# Patient Record
Sex: MALE | Race: BLACK OR AFRICAN AMERICAN | URBAN - METROPOLITAN AREA
[De-identification: names, ages, dates, MRNs, and addresses within clinical notes are randomized per-mention and may not be internally consistent; named-entity substitution may affect disease eponyms.]

---

## 2021-01-07 ENCOUNTER — OFFICE VISIT (OUTPATIENT)
Dept: URBAN - METROPOLITAN AREA CLINIC 82 | Facility: CLINIC | Age: 51
End: 2021-01-07
Payer: COMMERCIAL

## 2021-01-07 VITALS
BODY MASS INDEX: 28.14 KG/M2 | HEART RATE: 76 BPM | DIASTOLIC BLOOD PRESSURE: 92 MMHG | TEMPERATURE: 98.3 F | WEIGHT: 190 LBS | RESPIRATION RATE: 16 BRPM | SYSTOLIC BLOOD PRESSURE: 144 MMHG | HEIGHT: 69 IN

## 2021-01-07 DIAGNOSIS — K59.01 CONSTIPATION: ICD-10-CM

## 2021-01-07 DIAGNOSIS — Z43.1 PEG (PERCUTANEOUS ENDOSCOPIC GASTROSTOMY) ADJUSTMENT/REPLACEMENT/REMOVAL: ICD-10-CM

## 2021-01-07 PROBLEM — 14760008 CONSTIPATION: Status: ACTIVE | Noted: 2021-01-07

## 2021-01-07 PROCEDURE — 99212 OFFICE O/P EST SF 10 MIN: CPT | Performed by: INTERNAL MEDICINE

## 2021-01-07 RX ORDER — ASPIRIN 81 MG/1
TAKE 1 TABLET (81 MG) BY ORAL ROUTE ONCE DAILY TABLET, COATED ORAL 1
Qty: 0 | Refills: 0 | Status: ACTIVE | COMMUNITY
Start: 1900-01-01

## 2021-01-07 RX ORDER — CARVEDILOL 25 MG/1
TAKE 1 TABLET (25 MG) BY ORAL ROUTE 2 TIMES PER DAY WITH FOOD TABLET, FILM COATED ORAL 2
Qty: 0 | Refills: 0 | Status: ACTIVE | COMMUNITY
Start: 1900-01-01

## 2021-01-07 RX ORDER — LOSARTAN POTASSIUM 50 MG/1
TAKE 1 TABLET (50 MG) BY ORAL ROUTE ONCE DAILY TABLET ORAL 1
Qty: 0 | Refills: 0 | Status: ACTIVE | COMMUNITY
Start: 1900-01-01

## 2021-01-07 RX ORDER — PRAVASTATIN SODIUM 40 MG/1
TAKE 1 TABLET (40 MG) BY ORAL ROUTE ONCE DAILY TABLET ORAL 1
Qty: 0 | Refills: 0 | Status: ACTIVE | COMMUNITY
Start: 1900-01-01

## 2021-01-07 RX ORDER — AMANTADINE HYDROCHLORIDE 100 MG/1
TAKE 1 CAPSULE (100 MG) BY ORAL ROUTE ONCE DAILY CAPSULE ORAL 1
Qty: 0 | Refills: 0 | Status: ACTIVE | COMMUNITY
Start: 1900-01-01

## 2021-01-07 NOTE — PHYSICAL EXAM GASTROINTESTINAL
Abdomen , soft, nontender, nondistended , no guarding or rigidity , no masses palpable , PEG in place

## 2021-02-18 ENCOUNTER — OFFICE VISIT (OUTPATIENT)
Dept: URBAN - METROPOLITAN AREA CLINIC 82 | Facility: CLINIC | Age: 51
End: 2021-02-18
Payer: COMMERCIAL

## 2021-02-18 VITALS
HEIGHT: 69 IN | RESPIRATION RATE: 16 BRPM | HEART RATE: 80 BPM | DIASTOLIC BLOOD PRESSURE: 84 MMHG | BODY MASS INDEX: 28.14 KG/M2 | WEIGHT: 190 LBS | TEMPERATURE: 97.4 F | SYSTOLIC BLOOD PRESSURE: 132 MMHG

## 2021-02-18 DIAGNOSIS — Z43.1 PEG (PERCUTANEOUS ENDOSCOPIC GASTROSTOMY) ADJUSTMENT/REPLACEMENT/REMOVAL: ICD-10-CM

## 2021-02-18 PROCEDURE — 99212 OFFICE O/P EST SF 10 MIN: CPT | Performed by: INTERNAL MEDICINE

## 2021-02-18 RX ORDER — ASPIRIN 81 MG/1
TAKE 1 TABLET (81 MG) BY ORAL ROUTE ONCE DAILY TABLET, COATED ORAL 1
Qty: 0 | Refills: 0 | Status: ACTIVE | COMMUNITY
Start: 1900-01-01

## 2021-02-18 RX ORDER — CARVEDILOL 25 MG/1
TAKE 1 TABLET (25 MG) BY ORAL ROUTE 2 TIMES PER DAY WITH FOOD TABLET, FILM COATED ORAL 2
Qty: 0 | Refills: 0 | Status: ACTIVE | COMMUNITY
Start: 1900-01-01

## 2021-02-18 RX ORDER — PRAVASTATIN SODIUM 40 MG/1
TAKE 1 TABLET (40 MG) BY ORAL ROUTE ONCE DAILY TABLET ORAL 1
Qty: 0 | Refills: 0 | Status: ACTIVE | COMMUNITY
Start: 1900-01-01

## 2021-02-18 RX ORDER — AMANTADINE HYDROCHLORIDE 100 MG/1
TAKE 1 CAPSULE (100 MG) BY ORAL ROUTE ONCE DAILY CAPSULE ORAL 1
Qty: 0 | Refills: 0 | Status: ACTIVE | COMMUNITY
Start: 1900-01-01

## 2021-02-18 RX ORDER — LOSARTAN POTASSIUM 50 MG/1
TAKE 1 TABLET (50 MG) BY ORAL ROUTE ONCE DAILY TABLET ORAL 1
Qty: 0 | Refills: 0 | Status: ACTIVE | COMMUNITY
Start: 1900-01-01

## 2022-01-11 ENCOUNTER — OFFICE VISIT (OUTPATIENT)
Dept: URBAN - METROPOLITAN AREA CLINIC 82 | Facility: CLINIC | Age: 52
End: 2022-01-11

## 2022-01-12 ENCOUNTER — OFFICE VISIT (OUTPATIENT)
Dept: URBAN - METROPOLITAN AREA CLINIC 82 | Facility: CLINIC | Age: 52
End: 2022-01-12

## 2022-02-10 ENCOUNTER — OFFICE VISIT (OUTPATIENT)
Dept: URBAN - METROPOLITAN AREA CLINIC 82 | Facility: CLINIC | Age: 52
End: 2022-02-10
Payer: COMMERCIAL

## 2022-02-10 VITALS
HEIGHT: 69 IN | TEMPERATURE: 97.2 F | BODY MASS INDEX: 28.14 KG/M2 | HEART RATE: 72 BPM | DIASTOLIC BLOOD PRESSURE: 107 MMHG | WEIGHT: 190 LBS | SYSTOLIC BLOOD PRESSURE: 148 MMHG

## 2022-02-10 DIAGNOSIS — Z43.1 PEG (PERCUTANEOUS ENDOSCOPIC GASTROSTOMY) ADJUSTMENT/REPLACEMENT/REMOVAL: ICD-10-CM

## 2022-02-10 DIAGNOSIS — K94.23 PEG TUBE MALFUNCTION: ICD-10-CM

## 2022-02-10 PROCEDURE — 99213 OFFICE O/P EST LOW 20 MIN: CPT | Performed by: INTERNAL MEDICINE

## 2022-02-10 PROCEDURE — 49452 REPLACE G-J TUBE PERC: CPT | Performed by: INTERNAL MEDICINE

## 2022-02-10 RX ORDER — AMANTADINE HYDROCHLORIDE 100 MG/1
TAKE 1 CAPSULE (100 MG) BY ORAL ROUTE ONCE DAILY CAPSULE ORAL 1
Qty: 0 | Refills: 0 | Status: ACTIVE | COMMUNITY
Start: 1900-01-01

## 2022-02-10 RX ORDER — PRAVASTATIN SODIUM 40 MG/1
TAKE 1 TABLET (40 MG) BY ORAL ROUTE ONCE DAILY TABLET ORAL 1
Qty: 0 | Refills: 0 | Status: ACTIVE | COMMUNITY
Start: 1900-01-01

## 2022-02-10 RX ORDER — LOSARTAN POTASSIUM 50 MG/1
TAKE 1 TABLET (50 MG) BY ORAL ROUTE ONCE DAILY TABLET ORAL 1
Qty: 0 | Refills: 0 | Status: ACTIVE | COMMUNITY
Start: 1900-01-01

## 2022-02-10 RX ORDER — ASPIRIN 81 MG/1
TAKE 1 TABLET (81 MG) BY ORAL ROUTE ONCE DAILY TABLET, COATED ORAL 1
Qty: 0 | Refills: 0 | Status: ON HOLD | COMMUNITY
Start: 1900-01-01

## 2022-02-10 RX ORDER — CARVEDILOL 25 MG/1
TAKE 1 TABLET (25 MG) BY ORAL ROUTE 2 TIMES PER DAY WITH FOOD TABLET, FILM COATED ORAL 2
Qty: 0 | Refills: 0 | Status: ACTIVE | COMMUNITY
Start: 1900-01-01

## 2022-02-10 NOTE — HPI-OTHER HISTORIES
requesting peg replacement 20Fr, tube working but feeding flow has changes, has noticed some oozing blood around skin site, no pain

## 2023-02-16 ENCOUNTER — OFFICE VISIT (OUTPATIENT)
Dept: URBAN - METROPOLITAN AREA CLINIC 82 | Facility: CLINIC | Age: 53
End: 2023-02-16

## 2023-02-21 ENCOUNTER — OUT OF OFFICE VISIT (OUTPATIENT)
Dept: URBAN - METROPOLITAN AREA MEDICAL CENTER 24 | Facility: MEDICAL CENTER | Age: 53
End: 2023-02-21

## 2023-02-21 ENCOUNTER — CLAIMS CREATED FROM THE CLAIM WINDOW (OUTPATIENT)
Dept: URBAN - METROPOLITAN AREA MEDICAL CENTER 24 | Facility: MEDICAL CENTER | Age: 53
End: 2023-02-21
Payer: COMMERCIAL

## 2023-02-21 DIAGNOSIS — K62.89 ACUTE PROCTITIS: ICD-10-CM

## 2023-02-21 DIAGNOSIS — Z93.1 FEEDING BY G-TUBE: ICD-10-CM

## 2023-02-21 PROCEDURE — G8427 DOCREV CUR MEDS BY ELIG CLIN: HCPCS | Performed by: INTERNAL MEDICINE

## 2023-02-21 PROCEDURE — 99232 SBSQ HOSP IP/OBS MODERATE 35: CPT | Performed by: INTERNAL MEDICINE

## 2023-02-21 PROCEDURE — 99222 1ST HOSP IP/OBS MODERATE 55: CPT | Performed by: INTERNAL MEDICINE

## 2023-02-23 ENCOUNTER — OUT OF OFFICE VISIT (OUTPATIENT)
Dept: URBAN - METROPOLITAN AREA MEDICAL CENTER 24 | Facility: MEDICAL CENTER | Age: 53
End: 2023-02-23

## 2023-02-23 ENCOUNTER — CLAIMS CREATED FROM THE CLAIM WINDOW (OUTPATIENT)
Dept: URBAN - METROPOLITAN AREA MEDICAL CENTER 24 | Facility: MEDICAL CENTER | Age: 53
End: 2023-02-23
Payer: COMMERCIAL

## 2023-02-23 DIAGNOSIS — Z93.1 FEEDING BY G-TUBE: ICD-10-CM

## 2023-02-23 DIAGNOSIS — K62.89 ACUTE PROCTITIS: ICD-10-CM

## 2023-02-23 PROCEDURE — 99232 SBSQ HOSP IP/OBS MODERATE 35: CPT | Performed by: INTERNAL MEDICINE

## 2023-08-11 ENCOUNTER — OFFICE VISIT (OUTPATIENT)
Dept: URBAN - METROPOLITAN AREA CLINIC 82 | Facility: CLINIC | Age: 53
End: 2023-08-11
Payer: COMMERCIAL

## 2023-08-11 VITALS
SYSTOLIC BLOOD PRESSURE: 143 MMHG | HEIGHT: 69 IN | DIASTOLIC BLOOD PRESSURE: 90 MMHG | TEMPERATURE: 97.5 F | WEIGHT: 204 LBS | BODY MASS INDEX: 30.21 KG/M2 | HEART RATE: 81 BPM

## 2023-08-11 DIAGNOSIS — Z43.1 PEG (PERCUTANEOUS ENDOSCOPIC GASTROSTOMY) ADJUSTMENT/REPLACEMENT/REMOVAL: ICD-10-CM

## 2023-08-11 PROCEDURE — 99214 OFFICE O/P EST MOD 30 MIN: CPT | Performed by: INTERNAL MEDICINE

## 2023-08-11 RX ORDER — PRAVASTATIN SODIUM 40 MG/1
TAKE 1 TABLET (40 MG) BY ORAL ROUTE ONCE DAILY TABLET ORAL 1
Qty: 0 | Refills: 0 | Status: ACTIVE | COMMUNITY
Start: 1900-01-01

## 2023-08-11 RX ORDER — CARVEDILOL 25 MG/1
TAKE 1 TABLET (25 MG) BY ORAL ROUTE 2 TIMES PER DAY WITH FOOD TABLET, FILM COATED ORAL 2
Qty: 0 | Refills: 0 | Status: ACTIVE | COMMUNITY
Start: 1900-01-01

## 2023-08-11 RX ORDER — AMANTADINE HYDROCHLORIDE 100 MG/1
TAKE 1 CAPSULE (100 MG) BY ORAL ROUTE ONCE DAILY CAPSULE ORAL 1
Qty: 0 | Refills: 0 | Status: ACTIVE | COMMUNITY
Start: 1900-01-01

## 2023-08-11 RX ORDER — LOSARTAN POTASSIUM 50 MG/1
TAKE 1 TABLET (50 MG) BY ORAL ROUTE ONCE DAILY TABLET ORAL 1
Qty: 0 | Refills: 0 | Status: ACTIVE | COMMUNITY
Start: 1900-01-01

## 2023-08-11 RX ORDER — ASPIRIN 81 MG/1
TAKE 1 TABLET (81 MG) BY ORAL ROUTE ONCE DAILY TABLET, COATED ORAL 1
Qty: 0 | Refills: 0 | Status: ON HOLD | COMMUNITY
Start: 1900-01-01

## 2023-08-11 NOTE — HPI-TODAY'S VISIT:
8/11/2023 Patient is a 53 year old  male who presents for a PEG tube replacement. Patient's PEG tube came out today around 3 hours ago. Patient uses a PEG tube due to having a stroke in 2013.  Family member's deny patient being on any blood thinners.

## 2023-08-14 ENCOUNTER — TELEPHONE ENCOUNTER (OUTPATIENT)
Dept: URBAN - METROPOLITAN AREA CLINIC 82 | Facility: CLINIC | Age: 53
End: 2023-08-14

## 2023-10-03 ENCOUNTER — OFFICE VISIT (OUTPATIENT)
Dept: URBAN - METROPOLITAN AREA CLINIC 82 | Facility: CLINIC | Age: 53
End: 2023-10-03
Payer: COMMERCIAL

## 2023-10-03 ENCOUNTER — TELEPHONE ENCOUNTER (OUTPATIENT)
Dept: URBAN - METROPOLITAN AREA CLINIC 82 | Facility: CLINIC | Age: 53
End: 2023-10-03

## 2023-10-03 VITALS
HEART RATE: 81 BPM | TEMPERATURE: 98 F | DIASTOLIC BLOOD PRESSURE: 95 MMHG | SYSTOLIC BLOOD PRESSURE: 153 MMHG | HEIGHT: 69 IN

## 2023-10-03 DIAGNOSIS — K94.23 PEG TUBE MALFUNCTION: ICD-10-CM

## 2023-10-03 PROBLEM — 35298007: Status: ACTIVE | Noted: 2023-10-03

## 2023-10-03 PROBLEM — 71457002: Status: ACTIVE | Noted: 2023-10-03

## 2023-10-03 PROCEDURE — 99214 OFFICE O/P EST MOD 30 MIN: CPT | Performed by: INTERNAL MEDICINE

## 2023-10-03 PROCEDURE — 43763 RPLC GTUBE REVJ GSTRST TRC: CPT | Performed by: INTERNAL MEDICINE

## 2023-10-03 PROCEDURE — 43762 RPLC GTUBE NO REVJ TRC: CPT | Performed by: INTERNAL MEDICINE

## 2023-10-03 RX ORDER — CARVEDILOL 25 MG/1
TAKE 1 TABLET (25 MG) BY ORAL ROUTE 2 TIMES PER DAY WITH FOOD TABLET, FILM COATED ORAL 2
Qty: 0 | Refills: 0 | Status: ACTIVE | COMMUNITY
Start: 1900-01-01

## 2023-10-03 RX ORDER — ASPIRIN 81 MG/1
TAKE 1 TABLET (81 MG) BY ORAL ROUTE ONCE DAILY TABLET, COATED ORAL 1
Qty: 0 | Refills: 0 | Status: ON HOLD | COMMUNITY
Start: 1900-01-01

## 2023-10-03 RX ORDER — LOSARTAN POTASSIUM 50 MG/1
TAKE 1 TABLET (50 MG) BY ORAL ROUTE ONCE DAILY TABLET ORAL 1
Qty: 0 | Refills: 0 | Status: ACTIVE | COMMUNITY
Start: 1900-01-01

## 2023-10-03 RX ORDER — PRAVASTATIN SODIUM 40 MG/1
TAKE 1 TABLET (40 MG) BY ORAL ROUTE ONCE DAILY TABLET ORAL 1
Qty: 0 | Refills: 0 | Status: ACTIVE | COMMUNITY
Start: 1900-01-01

## 2023-10-03 RX ORDER — AMANTADINE HYDROCHLORIDE 100 MG/1
TAKE 1 CAPSULE (100 MG) BY ORAL ROUTE ONCE DAILY CAPSULE ORAL 1
Qty: 0 | Refills: 0 | Status: ACTIVE | COMMUNITY
Start: 1900-01-01

## 2023-10-03 NOTE — PHYSICAL EXAM GASTROINTESTINAL
G tube in place. granulation tissue  bs +  un able to flush g tube  replaced  16 fr to 18 fr balloon replacable  G tube

## 2023-10-03 NOTE — HPI-TODAY'S VISIT:
8/11/2023 Patient is a 53 year old  male who presents for a PEG tube replacement. Patient's PEG tube came out today around 3 hours ago. Patient uses a PEG tube due to having a stroke in 2013.  Family member's deny patient being on any blood thinners.  10/3/23 : pt was seen today accompanied by his wife for PEG dysfunction. patient has CVA and left hemiparesis . Since then  he had been on PEG tube for nutrition given the oropharyngeal dysphagia. wife reports Tube had been blocked since AM and not able to use .

## 2024-04-04 ENCOUNTER — OV EP (OUTPATIENT)
Dept: URBAN - METROPOLITAN AREA CLINIC 82 | Facility: CLINIC | Age: 54
End: 2024-04-04

## 2024-06-10 ENCOUNTER — OFFICE VISIT (OUTPATIENT)
Dept: URBAN - METROPOLITAN AREA CLINIC 82 | Facility: CLINIC | Age: 54
End: 2024-06-10
Payer: COMMERCIAL

## 2024-06-10 ENCOUNTER — DASHBOARD ENCOUNTERS (OUTPATIENT)
Age: 54
End: 2024-06-10

## 2024-06-10 VITALS
HEART RATE: 66 BPM | SYSTOLIC BLOOD PRESSURE: 115 MMHG | WEIGHT: 169 LBS | TEMPERATURE: 97.9 F | BODY MASS INDEX: 25.03 KG/M2 | HEIGHT: 69 IN | DIASTOLIC BLOOD PRESSURE: 78 MMHG

## 2024-06-10 DIAGNOSIS — R13.12 OROPHARYNGEAL DYSPHAGIA: ICD-10-CM

## 2024-06-10 DIAGNOSIS — K59.01 CONSTIPATION BY DELAYED COLONIC TRANSIT: ICD-10-CM

## 2024-06-10 DIAGNOSIS — Z43.1 PEG (PERCUTANEOUS ENDOSCOPIC GASTROSTOMY) ADJUSTMENT/REPLACEMENT/REMOVAL: ICD-10-CM

## 2024-06-10 PROCEDURE — 43762 RPLC GTUBE NO REVJ TRC: CPT | Performed by: INTERNAL MEDICINE

## 2024-06-10 PROCEDURE — B4088 GASTRO/JEJUNO TUBE, LOW-PRO: HCPCS | Performed by: INTERNAL MEDICINE

## 2024-06-10 PROCEDURE — 17250 CHEM CAUT OF GRANLTJ TISSUE: CPT | Performed by: INTERNAL MEDICINE

## 2024-06-10 PROCEDURE — 99213 OFFICE O/P EST LOW 20 MIN: CPT | Performed by: INTERNAL MEDICINE

## 2024-06-10 RX ORDER — AMLODIPINE BESYLATE 2.5 MG/1
1 TABLET TABLET ORAL ONCE A DAY
Status: ACTIVE | COMMUNITY

## 2024-06-10 RX ORDER — LOSARTAN POTASSIUM 50 MG/1
TAKE 1 TABLET (50 MG) BY ORAL ROUTE ONCE DAILY TABLET ORAL 1
Qty: 0 | Refills: 0 | Status: DISCONTINUED | COMMUNITY
Start: 1900-01-01

## 2024-06-10 RX ORDER — AMANTADINE HYDROCHLORIDE 100 MG/1
TAKE 1 CAPSULE (100 MG) BY ORAL ROUTE ONCE DAILY CAPSULE ORAL 1
Qty: 0 | Refills: 0 | Status: ACTIVE | COMMUNITY
Start: 1900-01-01

## 2024-06-10 RX ORDER — PRAVASTATIN SODIUM 40 MG/1
TAKE 1 TABLET (40 MG) BY ORAL ROUTE ONCE DAILY TABLET ORAL 1
Qty: 0 | Refills: 0 | Status: DISCONTINUED | COMMUNITY
Start: 1900-01-01

## 2024-06-10 RX ORDER — ASPIRIN 81 MG/1
TAKE 1 TABLET (81 MG) BY ORAL ROUTE ONCE DAILY TABLET, COATED ORAL 1
Qty: 0 | Refills: 0 | Status: ON HOLD | COMMUNITY
Start: 1900-01-01

## 2024-06-10 RX ORDER — CARVEDILOL 25 MG/1
TAKE 1 TABLET (25 MG) BY ORAL ROUTE 2 TIMES PER DAY WITH FOOD TABLET, FILM COATED ORAL 2
Qty: 0 | Refills: 0 | Status: ACTIVE | COMMUNITY
Start: 1900-01-01

## 2024-06-10 RX ORDER — TIZANIDINE 2 MG/1
1 TABLET AT BEDTIME AS NEEDED TABLET ORAL ONCE A DAY
Status: ACTIVE | COMMUNITY

## 2024-06-10 NOTE — HPI-TODAY'S VISIT:
8/11/2023 Patient is a 53 year old  male who presents for a PEG tube replacement. Patient's PEG tube came out today around 3 hours ago. Patient uses a PEG tube due to having a stroke in 2013.  Family member's deny patient being on any blood thinners.  10/3/23 : pt was seen today accompanied by his wife for PEG dysfunction. patient has CVA and left hemiparesis . Since then  he had been on PEG tube for nutrition given the oropharyngeal dysphagia. wife reports Tube had been blocked since AM and not able to use .  6/9/24; Patient was seen today accompanied by his mother for PEG tube exchange.  Patient is aphasic denies any abdominal pain he nods his head for any abdominal pain nausea vomiting.  He is tube feed dependent.  Currently on Plavix current medications were reviewed.  PEG tube needs to be changed week for the aging of the tube.

## 2025-05-01 ENCOUNTER — OFFICE VISIT (OUTPATIENT)
Dept: URBAN - METROPOLITAN AREA CLINIC 82 | Facility: CLINIC | Age: 55
End: 2025-05-01
Payer: COMMERCIAL

## 2025-05-01 DIAGNOSIS — R13.12 OROPHARYNGEAL DYSPHAGIA: ICD-10-CM

## 2025-05-01 DIAGNOSIS — K59.01 CONSTIPATION BY DELAYED COLONIC TRANSIT: ICD-10-CM

## 2025-05-01 DIAGNOSIS — Z43.1 PEG (PERCUTANEOUS ENDOSCOPIC GASTROSTOMY) ADJUSTMENT/REPLACEMENT/REMOVAL: ICD-10-CM

## 2025-05-01 PROCEDURE — 99213 OFFICE O/P EST LOW 20 MIN: CPT | Performed by: INTERNAL MEDICINE

## 2025-05-01 PROCEDURE — B4087 GASTRO/JEJUNO TUBE, STD: HCPCS | Performed by: INTERNAL MEDICINE

## 2025-05-01 RX ORDER — ASPIRIN 81 MG/1
TAKE 1 TABLET (81 MG) BY ORAL ROUTE ONCE DAILY TABLET, COATED ORAL 1
Qty: 0 | Refills: 0 | Status: ON HOLD | COMMUNITY
Start: 1900-01-01

## 2025-05-01 RX ORDER — AMLODIPINE BESYLATE 2.5 MG/1
1 TABLET TABLET ORAL ONCE A DAY
Status: ACTIVE | COMMUNITY

## 2025-05-01 RX ORDER — AMANTADINE HYDROCHLORIDE 100 MG/1
TAKE 1 CAPSULE (100 MG) BY ORAL ROUTE ONCE DAILY CAPSULE ORAL 1
Qty: 0 | Refills: 0 | Status: ACTIVE | COMMUNITY
Start: 1900-01-01

## 2025-05-01 RX ORDER — CARVEDILOL 25 MG/1
TAKE 1 TABLET (25 MG) BY ORAL ROUTE 2 TIMES PER DAY WITH FOOD TABLET, FILM COATED ORAL 2
Qty: 0 | Refills: 0 | Status: ACTIVE | COMMUNITY
Start: 1900-01-01

## 2025-05-01 RX ORDER — TIZANIDINE 2 MG/1
1 TABLET AT BEDTIME AS NEEDED TABLET ORAL ONCE A DAY
Status: ACTIVE | COMMUNITY

## 2025-05-01 NOTE — HPI-TODAY'S VISIT:
Patient was seen today accompanied by his mother for the evaluation of having PEG tube change.  Patient was having some discoloration of the PEG tube is been 1 year since the last tube exchange.  He has axial CVA and hemiplegic and mother helps with his day-to-day life.  Patient denies any complaints of constipation.

## 2025-06-19 ENCOUNTER — OFFICE VISIT (OUTPATIENT)
Dept: URBAN - METROPOLITAN AREA CLINIC 82 | Facility: CLINIC | Age: 55
End: 2025-06-19
Payer: COMMERCIAL

## 2025-06-19 DIAGNOSIS — Z43.1 PEG (PERCUTANEOUS ENDOSCOPIC GASTROSTOMY) ADJUSTMENT/REPLACEMENT/REMOVAL: ICD-10-CM

## 2025-06-19 DIAGNOSIS — K59.01 CONSTIPATION BY DELAYED COLONIC TRANSIT: ICD-10-CM

## 2025-06-19 DIAGNOSIS — R13.12 OROPHARYNGEAL DYSPHAGIA: ICD-10-CM

## 2025-06-19 PROCEDURE — 99213 OFFICE O/P EST LOW 20 MIN: CPT | Performed by: INTERNAL MEDICINE

## 2025-06-19 RX ORDER — AMLODIPINE BESYLATE 2.5 MG/1
1 TABLET TABLET ORAL ONCE A DAY
Status: ACTIVE | COMMUNITY

## 2025-06-19 RX ORDER — ASPIRIN 81 MG/1
TAKE 1 TABLET (81 MG) BY ORAL ROUTE ONCE DAILY TABLET, COATED ORAL 1
Qty: 0 | Refills: 0 | Status: ON HOLD | COMMUNITY
Start: 1900-01-01

## 2025-06-19 RX ORDER — CARVEDILOL 25 MG/1
TAKE 1 TABLET (25 MG) BY ORAL ROUTE 2 TIMES PER DAY WITH FOOD TABLET, FILM COATED ORAL 2
Qty: 0 | Refills: 0 | Status: ACTIVE | COMMUNITY
Start: 1900-01-01

## 2025-06-19 RX ORDER — AMANTADINE HYDROCHLORIDE 100 MG/1
TAKE 1 CAPSULE (100 MG) BY ORAL ROUTE ONCE DAILY CAPSULE ORAL 1
Qty: 0 | Refills: 0 | Status: ACTIVE | COMMUNITY
Start: 1900-01-01

## 2025-06-19 RX ORDER — TIZANIDINE 2 MG/1
1 TABLET AT BEDTIME AS NEEDED TABLET ORAL ONCE A DAY
Status: ACTIVE | COMMUNITY